# Patient Record
(demographics unavailable — no encounter records)

---

## 2024-11-15 NOTE — DISCUSSION/SUMMARY
[Normal Growth] : growth [Normal Development] : development [None] : No known medical problems [No Elimination Concerns] : elimination [No Feeding Concerns] : feeding [No Skin Concerns] : skin [Normal Sleep Pattern] : sleep [Family Support] : family support [Child Development and Behavior] : child development and behavior [Language Promotion/Hearing] : language promotion/hearing [Toliet Training Readiness] : toliet training readiness [Safety] : safety [No Medications] : ~He/She~ is not on any medications [Parent/Guardian] : parent/guardian [] : The components of the vaccine(s) to be administered today are listed in the plan of care. The disease(s) for which the vaccine(s) are intended to prevent and the risks have been discussed with the caretaker.  The risks are also included in the appropriate vaccination information statements which have been provided to the patient's caregiver.  The caregiver has given consent to vaccinate. [FreeTextEntry1] : Continue whole cow's milk. Continue table foods, 3 meals with 2-3 snacks per day. Incorporate fluorinated water daily in a sippy cup. Brush teeth twice a day with soft toothbrush. Recommend visit to dentist. When in car, keep child in rear-facing car seats until age 2, or until  the maximum height and weight for seat is reached. Put toddler to sleep in own bed or crib. Help toddler to maintain consistent daily routines and sleep schedule. Toilet training discussed. Recognize anxiety in new settings. Ensure home is safe. Be within arm's reach of toddler at all times. Use consistent, positive discipline. Read aloud to toddler. Next PE at 2 years of age

## 2024-11-15 NOTE — HISTORY OF PRESENT ILLNESS
[Normal] : Normal [Water heater temperature set at <120 degrees F] : Water heater temperature set at <120 degrees F [Car seat in back seat] : Car seat in back seat [Carbon Monoxide Detectors] : Carbon monoxide detectors [Smoke Detectors] : Smoke detectors [Mother] : mother [Cow's milk (Ounces per day ___)] : consumes [unfilled] oz of Cow's milk per day [Table food] : table food [No] : Patient does not go to dentist yearly [Vitamin] : Primary Fluoride Source: Vitamin [Playtime] : Playtime  [FreeTextEntry1] : 18 months old well visit

## 2025-01-03 NOTE — REVIEW OF SYSTEMS
[Negative] : Integumentary [Immunizations are up to date] : Immunizations are up to date [FreeTextEntry1] : parents declined Synagis injections

## 2025-01-03 NOTE — HISTORY OF PRESENT ILLNESS
[Gestational Age: ___] : Gestational Age in Weeks: [unfilled] [Chronological Age: ___] : Chronological Age in Months: [unfilled] [Corrected Age: ___] : Corrected Age: [unfilled] [No Parental Concerns] : no parental concerns [Urology: ___] : Urology: [unfilled] [ENT: ___] : ENT: [unfilled] [Ophthalmology: ___] : Ophthalmology: [unfilled] [No Feeding Issues] : no feeding issues. [Normal] : normal [None] : none [Finger Food] : finger food [Table Food] : table food [de-identified] : whole milk 1-2 cups a day [de-identified] : likes fish, chicken turkey, pasta, rice, fruits and vegetables  [de-identified] : 8:30 pm to 8 am, takes 2 naps  - in crib in his room

## 2025-01-03 NOTE — PLAN
[Adjusted age milestones discussed at length.] : Adjusted age milestones discussed at length. [Adjusted Age growth and feeding parameters discussed at length.] : Adjusted Age growth and feeding parameters discussed at length.  [Safety counseling given regarding major safety issues for children this age.] : Safety counseling given regarding major safety issues for children this age. [No pillow or bulky blankets in the cribs.] : No pillow or bulky blankets in the cribs. [Baby proofing discussed, socket plugs, cord and cable safety, tablecloth-removal.] : Baby proofing discussed, socket plugs, cord and cable safety, tablecloth-removal. [All medications should be stored in a child proof container out of reach of the child.] : All medications should be stored in a child proof container out of reach of the child.  [Reading daily was encouraged.] : Reading daily was encouraged.  [Parent was counseled regarding AAP recommendations concerning television watching under the age of two.] : Parent was counseled regarding AAP recommendations concerning television watching under the age of two.  [Avoid choking hazards such as peanuts, hot dogs, un-cut grapes, hot dogs, peanut butter, fruits with skins and balloons.] : Avoid choking hazards such as peanuts, hot dogs, un-cut grapes, hot dogs, peanut butter, fruits with skins and balloons.  [Discussed dental hygiene, addressed fluoride needs.] : Discussed dental hygiene, addressed fluoride needs.  [FreeTextEntry1] : speech therapy

## 2025-01-03 NOTE — REASON FOR VISIT
[Follow-Up ] : a  follow-up for [Mother] : mother [FreeTextEntry2] : assess for developmental delay secondary to prematurity 28 weeks [FreeTextEntry3] : Developmental and behavioral progress is of the utmost importance and involves complex nuance. Monitoring children with developmental and behavioral concerns is essential due to potential lifelong implications of diagnoses.

## 2025-01-03 NOTE — PHYSICAL EXAM
[Unfisted] : unfisted [Manipulates Fingers] : manipulates fingers [Transfer] : transfers objects [Alert To Sounds] : alert to sounds [Soothes When Picked Up] : soothes when picked up  [Social Smile] : has a social smile [Orients To Voice] : orients to voice [[place]] : coos [Laughs Aloud] : laughs aloud ["Juan Pablo Byrd"] : juan pablo mcadams [Razzing] : razzing [Plantar Grasp] : normal Plantar Grasp [Anterior Protective] : normal anterior protective [Normal] : sensation is intact to light touch [Crawl] : crawls  [Come to Sit] : comes to sit [Pull to Stand] : pulls to stand [Walk Alone] : walks alone [Walk Backwards] : walks backwards [Run] : runs [Unilateral Reach/Grasp] : unilaterally reaches/grasps  [Mature Pincer] : has mature pincer [Voluntary Release] : voluntary release  [Handedness] : hand preference noted [Finger Feeding] : finger feeding  [Spoon] : uses a spoon [Cup] : uses a cup [Helps with Dressing] : helps with dressing  [Helps with Undressing] : helps with undressing [Gesture Language] : gestures language [Understands "No"] : understands "No" [1 Step Command with Gesture] : follows 1 step commands with gesture [1 Step Command without Gesture] : follows 1 step commands without gesture [Babbling] : babbling ["William" Appropriately] : says "William" appropriately ["Mama" Appropriately] : says "Mama" appropriately [Vocabulary Of ___ Words] : has a vocabulary of [unfilled] words [Responds to Name] : responds to name  [[place] with Fork] : does not spear with fork [Undresses Completely] : does not undress completely [2 Step Commands] : does not follow 2 step commands [de-identified] : can walk up the stairs with assistance,  [de-identified] : would invite mom to play with him and able to maintain the play, tries to put his shoes and hat on, participates when sees mom cleaning, likes to play peek a corona [de-identified] : points to express his needs and share interest, starting to point to his body parts  [de-identified] : papa for grandfather, Jasmyn for his aunt [de-identified] : no clonus

## 2025-01-03 NOTE — SOCIAL HISTORY
[TextEntry] : lives home with both parents Mother works for [employer] care intake Father works for [employer] individual with disability Goes to  5 days a week No smoker No access to firearm